# Patient Record
Sex: MALE
[De-identification: names, ages, dates, MRNs, and addresses within clinical notes are randomized per-mention and may not be internally consistent; named-entity substitution may affect disease eponyms.]

---

## 2020-10-27 ENCOUNTER — HOSPITAL ENCOUNTER (OUTPATIENT)
Dept: HOSPITAL 88 - ER | Age: 85
Setting detail: OBSERVATION
LOS: 1 days | Discharge: HOME | End: 2020-10-28
Attending: STUDENT IN AN ORGANIZED HEALTH CARE EDUCATION/TRAINING PROGRAM | Admitting: STUDENT IN AN ORGANIZED HEALTH CARE EDUCATION/TRAINING PROGRAM
Payer: MEDICARE

## 2020-10-27 VITALS — DIASTOLIC BLOOD PRESSURE: 81 MMHG | SYSTOLIC BLOOD PRESSURE: 122 MMHG

## 2020-10-27 VITALS — WEIGHT: 160 LBS | HEIGHT: 65 IN | BODY MASS INDEX: 26.66 KG/M2

## 2020-10-27 VITALS — SYSTOLIC BLOOD PRESSURE: 122 MMHG | DIASTOLIC BLOOD PRESSURE: 81 MMHG

## 2020-10-27 VITALS — SYSTOLIC BLOOD PRESSURE: 150 MMHG | DIASTOLIC BLOOD PRESSURE: 82 MMHG

## 2020-10-27 VITALS — DIASTOLIC BLOOD PRESSURE: 82 MMHG | SYSTOLIC BLOOD PRESSURE: 150 MMHG

## 2020-10-27 DIAGNOSIS — E78.5: ICD-10-CM

## 2020-10-27 DIAGNOSIS — E86.0: ICD-10-CM

## 2020-10-27 DIAGNOSIS — N17.9: ICD-10-CM

## 2020-10-27 DIAGNOSIS — I95.1: Primary | ICD-10-CM

## 2020-10-27 DIAGNOSIS — E03.9: ICD-10-CM

## 2020-10-27 DIAGNOSIS — Z08: ICD-10-CM

## 2020-10-27 DIAGNOSIS — Z85.46: ICD-10-CM

## 2020-10-27 LAB
ALBUMIN SERPL-MCNC: 3.5 G/DL (ref 3.5–5)
ALBUMIN/GLOB SERPL: 0.8 {RATIO} (ref 0.8–2)
ALP SERPL-CCNC: 53 IU/L (ref 40–150)
ALT SERPL-CCNC: 13 IU/L (ref 0–55)
ANION GAP SERPL CALC-SCNC: 14.4 MMOL/L (ref 8–16)
BACTERIA URNS QL MICRO: (no result) /HPF
BASOPHILS # BLD AUTO: 0.1 10*3/UL (ref 0–0.1)
BASOPHILS NFR BLD AUTO: 0.4 % (ref 0–1)
BILIRUB UR QL: NEGATIVE
BUN SERPL-MCNC: 21 MG/DL (ref 7–26)
BUN/CREAT SERPL: 17 (ref 6–25)
CALCIUM SERPL-MCNC: 9 MG/DL (ref 8.4–10.2)
CHLORIDE SERPL-SCNC: 107 MMOL/L (ref 98–107)
CK MB SERPL-MCNC: < 1 NG/ML (ref 0–5)
CK SERPL-CCNC: 44 IU/L (ref 30–200)
CK SERPL-CCNC: 48 IU/L (ref 30–200)
CLARITY UR: CLEAR
CO2 SERPL-SCNC: 25 MMOL/L (ref 22–29)
COLOR UR: YELLOW
DEPRECATED APTT PLAS QN: 25.5 SECONDS (ref 23.8–35.5)
DEPRECATED INR PLAS: 0.99
DEPRECATED NEUTROPHILS # BLD AUTO: 11.8 10*3/UL (ref 2.1–6.9)
DEPRECATED RBC URNS MANUAL-ACNC: (no result) /HPF (ref 0–5)
EGFRCR SERPLBLD CKD-EPI 2021: 54 ML/MIN (ref 60–?)
EOSINOPHIL # BLD AUTO: 0.1 10*3/UL (ref 0–0.4)
EOSINOPHIL NFR BLD AUTO: 0.5 % (ref 0–6)
EPI CELLS URNS QL MICRO: (no result) /LPF
ERYTHROCYTE [DISTWIDTH] IN CORD BLOOD: 14.3 % (ref 11.7–14.4)
GLOBULIN PLAS-MCNC: 4.2 G/DL (ref 2.3–3.5)
GLUCOSE SERPLBLD-MCNC: 114 MG/DL (ref 74–118)
HCT VFR BLD AUTO: 42.7 % (ref 38.2–49.6)
HGB BLD-MCNC: 13.8 G/DL (ref 14–18)
HYALINE CASTS #/AREA URNS LPF: (no result) /[LPF] (ref 0–1)
KETONES UR QL STRIP.AUTO: NEGATIVE
LEUKOCYTE ESTERASE UR QL STRIP.AUTO: NEGATIVE
LYMPHOCYTES # BLD: 2.6 10*3/UL (ref 1–3.2)
LYMPHOCYTES NFR BLD AUTO: 16.3 % (ref 18–39.1)
MAGNESIUM SERPL-MCNC: 1.9 MG/DL (ref 1.3–2.1)
MCH RBC QN AUTO: 28.8 PG (ref 28–32)
MCHC RBC AUTO-ENTMCNC: 32.3 G/DL (ref 31–35)
MCV RBC AUTO: 89 FL (ref 81–99)
MONOCYTES # BLD AUTO: 1.1 10*3/UL (ref 0.2–0.8)
MONOCYTES NFR BLD AUTO: 7.1 % (ref 4.4–11.3)
MUCOUS THREADS URNS QL MICRO: (no result)
NEUTS SEG NFR BLD AUTO: 75 % (ref 38.7–80)
NITRITE UR QL STRIP.AUTO: NEGATIVE
PLATELET # BLD AUTO: 358 X10E3/UL (ref 140–360)
POTASSIUM SERPL-SCNC: 4.4 MMOL/L (ref 3.5–5.1)
PROT UR QL STRIP.AUTO: NEGATIVE
PROTHROMBIN TIME: 13.6 SECONDS (ref 11.9–14.5)
RBC # BLD AUTO: 4.8 X10E6/UL (ref 4.3–5.7)
SODIUM SERPL-SCNC: 142 MMOL/L (ref 136–145)
SP GR UR STRIP: >=1.03 (ref 1.01–1.02)
UROBILINOGEN UR STRIP-MCNC: 1 MG/DL (ref 0.2–1)
WBC #/AREA URNS HPF: (no result) /HPF (ref 0–5)

## 2020-10-27 PROCEDURE — 36415 COLL VENOUS BLD VENIPUNCTURE: CPT

## 2020-10-27 PROCEDURE — 99284 EMERGENCY DEPT VISIT MOD MDM: CPT

## 2020-10-27 PROCEDURE — 84439 ASSAY OF FREE THYROXINE: CPT

## 2020-10-27 PROCEDURE — 82550 ASSAY OF CK (CPK): CPT

## 2020-10-27 PROCEDURE — 93005 ELECTROCARDIOGRAM TRACING: CPT

## 2020-10-27 PROCEDURE — 84443 ASSAY THYROID STIM HORMONE: CPT

## 2020-10-27 PROCEDURE — 85025 COMPLETE CBC W/AUTO DIFF WBC: CPT

## 2020-10-27 PROCEDURE — 85610 PROTHROMBIN TIME: CPT

## 2020-10-27 PROCEDURE — 80061 LIPID PANEL: CPT

## 2020-10-27 PROCEDURE — 81001 URINALYSIS AUTO W/SCOPE: CPT

## 2020-10-27 PROCEDURE — 87086 URINE CULTURE/COLONY COUNT: CPT

## 2020-10-27 PROCEDURE — 80053 COMPREHEN METABOLIC PANEL: CPT

## 2020-10-27 PROCEDURE — 93306 TTE W/DOPPLER COMPLETE: CPT

## 2020-10-27 PROCEDURE — 71045 X-RAY EXAM CHEST 1 VIEW: CPT

## 2020-10-27 PROCEDURE — 84484 ASSAY OF TROPONIN QUANT: CPT

## 2020-10-27 PROCEDURE — 82553 CREATINE MB FRACTION: CPT

## 2020-10-27 PROCEDURE — 83735 ASSAY OF MAGNESIUM: CPT

## 2020-10-27 PROCEDURE — 85730 THROMBOPLASTIN TIME PARTIAL: CPT

## 2020-10-27 PROCEDURE — 70450 CT HEAD/BRAIN W/O DYE: CPT

## 2020-10-27 PROCEDURE — 84100 ASSAY OF PHOSPHORUS: CPT

## 2020-10-27 PROCEDURE — 83880 ASSAY OF NATRIURETIC PEPTIDE: CPT

## 2020-10-27 NOTE — DIAGNOSTIC IMAGING REPORT
Examination: CT head without contrast

Clinical Indication: ^N ^SYNCOPE ^[id number] ^1110.

Technique: Transaxial noncontrast images from the skull base through the vertex

were obtained. Sagittal and coronal reformatted images were done.

Dose modulation, iterative reconstruction, and/or weight based adjustment of

the mA/kV was utilized to reduce the radiation dose to as low as reasonably

achievable. 

Comparison: None.



Findings:



Scalp: No abnormalities.

Bones: Intact. No fractures.  No blastic or lytic lesions. 



Brain sulci: Appropriate for patient's age.

Ventricles: Normal in size and configuration.   No hydrocephalus.  .



Extra-axial space:

No abnormalities.



Parenchyma: 

There are patchy areas of low-attenuation within subcortical and

periventricular white matter, nonspecific, but could represent microvascular

ischemic disease.

Chronic lacunar infarct in the left globus pallidus.

No masses, hemorrhage, or acute or chronic cortical based vascular insults.



Suprasellar region: No abnormalities.

Craniocervical junction: The foramen magnum is patent.  No Chiari one

malformation.



Impression:



1.  No acute intracranial finding.



2.  Mild chronic microvascular ischemic change. Chronic lacunar infarct in the

left globus pallidus.



Signed by: Dr. Melissa Rivera M.D. on 10/27/2020 12:21 PM

## 2020-10-27 NOTE — DIAGNOSTIC IMAGING REPORT
EXAMINATION:  CHEST SINGLE (PORTABLE)    



INDICATION: Syncope



COMPARISON: None

     

FINDINGS:



LINES/TUBES:EKG leads overlie the chest.



LUNGS:The lungs are moderately inflated. No focal consolidation or pulmonary

edema.



PLEURA:No pleural effusion or pneumothorax.



MEDIASTINUM:The cardiomediastinal silhouette appears normal in size and shape.

Atherosclerotic calcifications of the thoracic aorta.



BONES/SOFT TISSUES:No acute osseous injury.



ABDOMEN:No free air under the diaphragm.





IMPRESSION: 

No focal pneumonia or pulmonary edema.



Signed by: Shira Banegas MD on 10/27/2020 11:38 AM influenza

## 2020-10-27 NOTE — EMERGENCY DEPARTMENT NOTE
History of Present Illnes


History of Present Illness


Chief Complaint:  General Medicine Complaints


History of Present Illness


This is a 88 year old  male pt came in via POV for evaluation of syncope

(pt denies LOC but family says he was unresponsive/+LOC for ~1 minute), pt 

states that he was sitting at table drinking coffee when he fainted, denies 

actually falling to the floor but states that he experienced blurred vision 

prior to the fainting episode, denies any chest pain,dizziness or 

lightheadedness, pt has no complaints at this time.


Historian:  Patient, Family Member


Arrival Mode:  Car


 Required:  No


Onset (how long ago):  minute(s)


Radiation:  Reports non-radiation


Severity:  severe


Onset quality:  sudden


Timing of current episode:  sporadic


Progression:  resolved


Chronicity:  new


Context:  Denies recent illness


Relieving factors:  none


Exacerbating factors:  none


Associated symptoms:  Reports denies other symptoms





Past Medical/Family History


Physician Review


I have reviewed the patient's past medical and family history.  Any updates have

been documented here.





Past Medical History


Recent Fever:  No


Clinical Suspicion of Infectio:  No


New/Unexplained Change in Ment:  No


Past Medical History:  Hypertension, Hypothyroidism


Other Medical History:  


HIGH CHOLESTEROL





PROSTATE CA


Other Surgery:  


TURP





EXPLORATORY LAP





Social History


Smoking Cessation:  Never Smoker


Counseling Performed:  No


Alcohol Use:  None


Any Illegal Drug Use:  No


TB Exposure/Symptoms:  No


Physically hurt or threatened:  No





Family History


Family history of heart diseas:  No





Other


Last Tetanus:  Y


Any Pre-Existing Lines (PICC,:  No





Review of Systems


Review of Systems


Constitutional:  Reports no symptoms


EENTM:  Reports no symptoms


Cardiovascular:  Reports syncope


Respiratory:  Reports no symptoms


Gastrointestinal:  Reports no symptoms


Genitourinary:  Reports no symptoms


Musculoskeletal:  Reports no symptoms


Integumentary:  Reports no symptoms


Neurological:  Reports no symptoms


Psychological:  Reports no symptoms


Endocrine:  Reports no symptoms


Hematological/Lymphatic:  Reports no symptoms





Physical Exam


Related Data


Allergies:  


Coded Allergies:  


     No Known Allergies (Unverified , 3/6/16)


Triage Vital Signs





Vital Signs








  Date Time  Temp Pulse Resp B/P (MAP) Pulse Ox O2 Delivery O2 Flow Rate FiO2


 


10/27/20 10:15 97.9 92 20 140/103 99 Room Air  








Vital signs reviewed:  Yes





Physical Exam


CONSTITUTIONAL





Constitutional:  Present well-developed, Present well-nourished


HENT


HENT:  Present normocephalic, Present atraumatic, Present oropharynx 

clear/moist, Present nose normal


HENT L/R:  Present left ext ear normal, Present right ext ear normal


EYES





Eyes:  Reports PERRL, Reports conjunctivae normal


NECK


Neck:  Present ROM normal


PULMONARY


Pulmonary:  Present effort normal, Present breath sounds normal


CARDIOVASCULAR





Cardiovascular:  Present regular rhythm, Present heart sounds normal, Present 

capillary refill normal, Present normal rate


GASTROINTESTINAL





Abdominal:  Present soft, Present nontender, Present bowel sounds normal


GENITOURINARY





Genitourinary:  Present exam deferred


SKIN


Skin:  Present warm, Present dry


MUSCULOSKELETAL





Musculoskeletal:  Present ROM normal


NEUROLOGICAL





Neurological:  Present alert, Present oriented x 3, Present no gross motor or 

sensory deficits


PSYCHOLOGICAL


Psychological:  Present mood/affect normal, Present judgement normal





Results


Laboratory


Result Diagram:  


10/27/20 1016                                                                   

            10/27/20 1016





Laboratory





Laboratory Tests








Test


 10/27/20


11:47 10/27/20


10:16


 


White Blood Count


 


 15.74 x10e3/uL


(4.8-10.8)


 


Red Blood Count


 


 4.80 x10e6/uL


(4.3-5.7)


 


Hemoglobin


 


 13.8 g/dL


(14.0-18.0)


 


Hematocrit


 


 42.7 %


(38.2-49.6)


 


Mean Corpuscular Volume


 


 89.0 fL


(81-99)


 


Mean Corpuscular Hemoglobin


 


 28.8 pg


(28-32)


 


Mean Corpuscular Hemoglobin


Concent 


 32.3 g/dL


(31-35)


 


Red Cell Distribution Width


 


 14.3 %


(11.7-14.4)


 


Platelet Count


 


 358 x10e3/uL


(140-360)


 


Neutrophils (%) (Auto)


 


 75.0 %


(38.7-80.0)


 


Lymphocytes (%) (Auto)


 


 16.3 %


(18.0-39.1)


 


Monocytes (%) (Auto)


 


 7.1  %


(4.4-11.3)


 


Eosinophils (%) (Auto)


 


 0.5 %


(0.0-6.0)


 


Basophils (%) (Auto)


 


 0.4 %


(0.0-1.0)


 


Neutrophils # (Auto)  11.8 (2.1-6.9) 


 


Lymphocytes # (Auto)  2.6 (1.0-3.2) 


 


Monocytes # (Auto)  1.1 (0.2-0.8) 


 


Eosinophils # (Auto)  0.1 (0.0-0.4) 


 


Basophils # (Auto)  0.1 (0.0-0.1) 


 


Absolute Immature Granulocyte


(auto 


 0.11 x10e3/uL


(0-0.1)


 


Prothrombin Time


 


 13.6 seconds


(11.9-14.5)


 


Prothromb Time International


Ratio 


 0.99 





 


Activated Partial


Thromboplast Time 


 25.5 seconds


(23.8-35.5)


 


Sodium Level


 


 142 mmol/L


(136-145)


 


Potassium Level


 


 4.4 mmol/L


(3.5-5.1)


 


Chloride Level


 


 107 mmol/L


()


 


Carbon Dioxide Level


 


 25 mmol/L


(22-29)


 


Anion Gap


 


 14.4 mmol/L


(8-16)


 


Blood Urea Nitrogen


 


 21 mg/dL


(7-26)


 


Creatinine


 


 1.27 mg/dL


(0.72-1.25)


 


Estimat Glomerular Filtration


Rate 


 54 ML/MIN


(60-)


 


BUN/Creatinine Ratio  17 (6-25) 


 


Glucose Level


 


 114 mg/dL


()


 


Calcium Level


 


 9.0 mg/dL


(8.4-10.2)


 


Magnesium Level


 


 1.9 MG/DL


(1.3-2.1)


 


Total Bilirubin


 


 0.4 mg/dL


(0.2-1.2)


 


Aspartate Amino Transf


(AST/SGOT) 


 21 IU/L (5-34) 





 


Alanine Aminotransferase


(ALT/SGPT) 


 13 IU/L (0-55) 





 


Alkaline Phosphatase


 


 53 IU/L


()


 


Creatine Kinase


 


 48 IU/L


()


 


Creatine Kinase MB


 


 < 1.00 ng/mL


(0-5.0)


 


Troponin I


 


 0.001 ng/mL


(0-0.300)


 


B-Type Natriuretic Peptide


 


 32.2 pg/mL


(0-100)


 


Total Protein


 


 7.7 g/dL


(6.5-8.1)


 


Albumin


 


 3.5 g/dL


(3.5-5.0)


 


Globulin


 


 4.2 g/dL


(2.3-3.5)


 


Albumin/Globulin Ratio  0.8 (0.8-2.0) 








Lab results reviewed:  Yes





Imaging


Imaging results reviewed:  Yes


Impressions


EXAMINATION:  CHEST SINGLE (PORTABLE)    





INDICATION: Syncope





COMPARISON: None


     


FINDINGS:





LINES/TUBES:EKG leads overlie the chest.





LUNGS:The lungs are moderately inflated. No focal consolidation or pulmonary


edema.





PLEURA:No pleural effusion or pneumothorax.





MEDIASTINUM:The cardiomediastinal silhouette appears normal in size and shape.


Atherosclerotic calcifications of the thoracic aorta.





BONES/SOFT TISSUES:No acute osseous injury.





ABDOMEN:No free air under the diaphragm.








IMPRESSION: 


No focal pneumonia or pulmonary edema.





Signed by: Shira Banegas MD on 10/27/2020 11:38 AM





Procedures


12 Lead ECG Interpretation


ECG Interpretation :  


   ECG:  ECG 1


   :  Interpreted by ED physician


   Date:  Oct 27, 2020


   Time:  10:19


   Rhythm:  sinus rhythm


   Rate:  normal


   BPM:  97


   QRS axis:  normal


   ST segments normal:  Yes


   T waves normal:  Yes


   Clinical Impression:  normal ECG





Assessment & Plan


Medical Decision Making


MDM


syncope - check CBC, CHEM, ECG, CARDIACS, CT BRAIN, UA - EVAL FOR DYSRHYTHMIA, 

STEMI/NSTEMI, DEHYDRATION, RENAL INSUFF, ELECTROLYTE ABNL. ADMIT TELE, R/O 

DYSRHYTHMIA/NSTEMI





Reassessment


Reassessment


ADMIT TO DR DESI WELLS





Assessment & Plan


Final Impression:  


(1) Syncope


Depart Disposition:  ADMITTED


Last Vital Signs











  Date Time  Temp Pulse Resp B/P (MAP) Pulse Ox O2 Delivery O2 Flow Rate FiO2


 


10/27/20 10:15 97.9 92 20 140/103 99 Room Air  








Home Meds


Reported Medications


Pravastatin Sodium (PRAVASTATIN SODIUM) 20 Mg Tablet, 20 MG PO DAILY


   3/6/16


Levothyroxine Sodium (LEVOTHYROXINE SODIUM) 50 Mcg Tablet, 50 MCG PO DAILY, #30 

TAB


   3/6/16


Tamsulosin Hcl (TAMSULOSIN HCL) 0.4 Mg Cap.er.24h, 0.4 MG PO DAILY


   3/6/16


Medications in the ED





Sodium Chloride 1,000 ml @  0 mls/hr Q0M STAT IV ;  Start 10/27/20 at 10:30;  

Stop 10/27/20 at 10:34;  Status DC


Ondansetron HCl 4 mg Q4H  PRN IV NAUSEA AND VOMITING;  Start 10/27/20 at 12:00; 

Stop 11/26/20 at 11:59;  Status UNV


Acetaminophen 650 mg Q6H  PRN PO Mild Pain (1-3) or Fever>100.8;  Start 10/27/20

at 12:00;  Stop 11/26/20 at 11:59;  Status UNV











FATOUMATA ZUNIGA MD               Oct 27, 2020 12:03

## 2020-10-27 NOTE — NUR
Received patient from ER. Patient A/O X3, even respirations on RA. Lung sounds clear. Tele 
#6  with a first degree block. Left AC 20 gauge IV intact/patent. Oriented patient to 
room and call light. Call light in reach will continue to monitor patient.

## 2020-10-27 NOTE — NUR
Orthostatic vital signs:

Lying- BP: 144/74, HR: 113, O2: 99%

Sitting- BP: 122/79, HR: 122, O2: 99%

Standing- BP: 127/71, HR: 120, O2: 100%



Dr. Harrington made aware. New orders for NS @ 100 cc/hr x1 liter.

## 2020-10-27 NOTE — NUR
Patient visited in room (Rm 209) during nursing rounds. Patient alert and oriented x3. 
Ambulatory in room with standby assist prn. Plan to transfer pt room 201 for safety. Wife at 
bedside. On IVF (NS at 100ml/hr). Pt denies pain or nausea. Orthostatic V/S checked Qshift. 
Call bell within reach. Will monitor pt closely.

## 2020-10-27 NOTE — NUR
H&P



Chief Complaint - syncope



History of Present Illness

Mr Villeda is an 87 yo M with PMH significant for HLD, prostate cancer s/p 
TURP 2015, and hypothyroidism who presents after syncopal episode. Patient was a
t the breakfast table with family members when he began to feel light-headed 
with blurry vision then lost consciousness. He did not fall out of his chair 
or hit his head. He had laid his head down on the table and was unresponsive 
for about 1 minute per family members. Patient then regained consciousness and 
did not recall the event, last memory was feeling light-headed prior to losing 
consciousness. He was then brought to [facility] ED. 



In ED, vitals were stable, EKG normal. CT head without acute abnormalities. CXR 
unremarkable. Labs only significant for WBC 15k, anemia w/ hgb 13.8, and ROSETTE 
with Cr 1.27. Admitted for further work-up. 





Home Medications

Please see admission medication reconciliation



Review of Systems

General: No fever, chills, or fatigue

HEENT: Denies visual changes, hearing loss, congestion, rhinorrhea, or bleeding

Respiratory: No SOB, cough, or hemoptysis

Cardiovascular: No chest pain, palpitations, ARITA, orthopnea, PND, leg edema, or 
claudication

Gastrointestinal: No nausea, vomiting, diarrhea, constipation, or abdominal 
pain

G/U: Denies dysuria, hematuria, incontinence, or discharge

Musculoskeletal: No myalgias or arthralgias

Neurological: No syncope, seizures, headaches, changes in sensation, or 
weakness

Hematology: No bruising, bleeding, or lymphadenopathy

Endocrine: No heat or cold intolerance, hair loss, or weight changes

Skin: No rashes, sores, itching, bruising

Psychiatric: Denies depression or elevated mood, or anxiety



Past Medical History

Hyperlipidemia

Prostate cancer s/p TURP and exploratory laparotomy 2015



Past Surgical History

TURP and exploratory laparotomy for prostate Ca 2015

Left eye cataract surgery



Family History

Denies family history of heart disease

Mother with breast cancer



Social History

Does not drink, does not smoke, does not use drugs. 



Allergies

NKDA



Physical Exam

Vitals: Temp: 99.1P: 110BP: 122/81RR: 20SpO2: 98%

General Appearance: The patient is alert, oriented and in no acute distress. 
Appears euvolemic.

Skin: Warm and hydrated without any rash. 

HEENT: Head is normocephalic, atraumatic. Nontender sinuses. Pupils are equal 
and reactive. The nares are patent. Oropharynx is moist and clear without 
lesions.

Neck: Supple without lymphadenopathy. No JVD. Thyroid NV/NP

Heart / Cardiovascular: Regular rate and rhythm. Normal S1 and S2 without 
S3/S4. No murmurs, rubs or gallops. Peripheral pulses symmetric +2.

Respiratory / Chest: No crackles or wheezes are heard. Symmetric breath sounds. 
Preserved chest expansion.

Abdomen: Soft, nontender, nondistended with good bowel sounds heard. No 
clinical organomegaly.

Renal: There is no costovertebral angle tenderness.

Extremities: Without cyanosis, clubbing or edema. Preserved ROM. 

Neurological: Gross nonfocal. Patient oriented x 3. Cranial nerves II - XII 
Grossly intact. DTRs +2. MS: 5/5 globally.





Assessment/Plan 

#Syncope

#Orthostatic hypotension

- EKG with NSR

- keep on telemetry

- TTE ordered

- TSH

- orthostatic vital signs positive (lying down 144 SBP, dropped to 122 sitting 
and 127 standing)

- maintenance IVF started, likely dehydrated as patient admits to poor water 
intake and recent self-limited bout of diarrhea

- if above w/u negative, patient may benefit from event monitor outpatient; 
would need Cardiology follow-up



#Leukocytosis

- WBC 15k; patient afebrile

- UA not concerning for UTI

- CXR clear

- could be concentration in setting of dehydration



#ROSETTE

- unknown baseline Cr, presented with Cr 1.27

- likely pre-renal, will start IVF and recheck CMP in AM

- renally dose medications



#HLD

- continue home statin





I have spent 70 minutes face-to-face time with patient, reviewing clinical 
data, and formulating plan of treatment. 



Fran Harrington MD

Internal Medicine

## 2020-10-28 VITALS — SYSTOLIC BLOOD PRESSURE: 148 MMHG | DIASTOLIC BLOOD PRESSURE: 90 MMHG

## 2020-10-28 VITALS — SYSTOLIC BLOOD PRESSURE: 149 MMHG | DIASTOLIC BLOOD PRESSURE: 82 MMHG

## 2020-10-28 VITALS — SYSTOLIC BLOOD PRESSURE: 165 MMHG | DIASTOLIC BLOOD PRESSURE: 85 MMHG

## 2020-10-28 VITALS — DIASTOLIC BLOOD PRESSURE: 85 MMHG | SYSTOLIC BLOOD PRESSURE: 165 MMHG

## 2020-10-28 VITALS — DIASTOLIC BLOOD PRESSURE: 94 MMHG | SYSTOLIC BLOOD PRESSURE: 132 MMHG

## 2020-10-28 LAB
ALBUMIN SERPL-MCNC: 2.9 G/DL (ref 3.5–5)
ALBUMIN/GLOB SERPL: 0.8 {RATIO} (ref 0.8–2)
ALP SERPL-CCNC: 41 IU/L (ref 40–150)
ALT SERPL-CCNC: 11 IU/L (ref 0–55)
ANION GAP SERPL CALC-SCNC: 13.1 MMOL/L (ref 8–16)
BASOPHILS # BLD AUTO: 0 10*3/UL (ref 0–0.1)
BASOPHILS NFR BLD AUTO: 0.3 % (ref 0–1)
BUN SERPL-MCNC: 18 MG/DL (ref 7–26)
BUN/CREAT SERPL: 18 (ref 6–25)
CA-I BLDV-MCNC: 1.2 MMOL/L (ref 1.09–1.3)
CALCIUM SERPL-MCNC: 8.2 MG/DL (ref 8.4–10.2)
CHLORIDE SERPL-SCNC: 110 MMOL/L (ref 98–107)
CHOLEST SERPL-MCNC: 123 MD/DL (ref 0–199)
CHOLEST/HDLC SERPL: 2.9 {RATIO} (ref 3.9–4.7)
CK MB SERPL-MCNC: 1.6 NG/ML (ref 0–5)
CK SERPL-CCNC: 71 IU/L (ref 30–200)
CO2 SERPL-SCNC: 23 MMOL/L (ref 22–29)
DEPRECATED NEUTROPHILS # BLD AUTO: 9.1 10*3/UL (ref 2.1–6.9)
DEPRECATED PHOSPHATE SERPL-MCNC: 2.2 MG/DL (ref 2.3–4.7)
EGFRCR SERPLBLD CKD-EPI 2021: > 60 ML/MIN (ref 60–?)
EOSINOPHIL # BLD AUTO: 0.1 10*3/UL (ref 0–0.4)
EOSINOPHIL NFR BLD AUTO: 0.6 % (ref 0–6)
ERYTHROCYTE [DISTWIDTH] IN CORD BLOOD: 14.5 % (ref 11.7–14.4)
GLOBULIN PLAS-MCNC: 3.7 G/DL (ref 2.3–3.5)
GLUCOSE SERPLBLD-MCNC: 98 MG/DL (ref 74–118)
HCT VFR BLD AUTO: 39 % (ref 38.2–49.6)
HDLC SERPL-MSCNC: 42 MG/DL (ref 40–60)
HGB BLD-MCNC: 12.6 G/DL (ref 14–18)
LDLC SERPL CALC-MCNC: 67 MG/DL (ref 60–130)
LYMPHOCYTES # BLD: 1.9 10*3/UL (ref 1–3.2)
LYMPHOCYTES NFR BLD AUTO: 15.5 % (ref 18–39.1)
MAGNESIUM SERPL-MCNC: 1.8 MG/DL (ref 1.3–2.1)
MCH RBC QN AUTO: 28.8 PG (ref 28–32)
MCHC RBC AUTO-ENTMCNC: 32.3 G/DL (ref 31–35)
MCV RBC AUTO: 89 FL (ref 81–99)
MONOCYTES # BLD AUTO: 1.2 10*3/UL (ref 0.2–0.8)
MONOCYTES NFR BLD AUTO: 9.4 % (ref 4.4–11.3)
NEUTS SEG NFR BLD AUTO: 73.7 % (ref 38.7–80)
PLATELET # BLD AUTO: 196 X10E3/UL (ref 140–360)
POTASSIUM SERPL-SCNC: 5.1 MMOL/L (ref 3.5–5.1)
RBC # BLD AUTO: 4.38 X10E6/UL (ref 4.3–5.7)
SODIUM SERPL-SCNC: 141 MMOL/L (ref 136–145)
T4 FREE SERPL-MCNC: 1.09 NG/DL (ref 0.8–1.8)
TRIGL SERPL-MCNC: 69 MG/DL (ref 0–149)
TSH SERPL DL<=0.005 MIU/L-ACNC: 1.38 UIU/ML (ref 0.35–4.94)

## 2020-10-28 NOTE — NUR
Attempted to call Dr. Fran Harrington for possible medication for diarrhea per pt request but 
no answer from MD. Informed pt. Patient agreed will wait on MD in AM.

## 2020-10-28 NOTE — NUR
spoke with Dr. Fran Harrington regarding Echo order. no new orders received. Dr. Harrington says he 
is on his way to see patient.

## 2020-11-02 NOTE — NUR
Discharge Summary



Patient: Jasbir Villeda

Admission date: 10/27/2020 

Discharge date: 10/28/2020

Attending physician: Fran Harrington MD

Consultation: none



Admitting Diagnosis: syncope, orthostatic hypotension, dehydration, 
leukocytosis, ROSETTE, Hyperlipidemia, history of prostate cancer, hypothyroidism

Discharge Diagnosis: syncope, orthostatic hypotension, dehydration, 
leukocytosis, ROSETTE, Hyperlipidemia, history of prostate cancer, hypothyroidism



Procedures: None



Hospital Course: 

Mr Villeda is an 89 yo M with PMH significant for HLD, prostate cancer s/p 
TURP 2015, and hypothyroidism who presents after syncopal episode. Patient was a
t the breakfast table with family members when he began to feel light-headed 
with blurry vision then lost consciousness. He did not fall out of his chair 
or hit his head. He had laid his head down on the table and was unresponsive 
for about 1 minute per family members. Patient then regained consciousness and 
did not recall the event, last memory was feeling light-headed prior to losing 
consciousness. He was then brought to [facility] ED. 



In ED, vitals were stable, EKG normal. CT head without acute abnormalities. CXR 
unremarkable. Labs only significant for WBC 15k, anemia w/ hgb 13.8, and ROSETTE 
with Cr 1.27. Admitted for further work-up. 



Telemetry without arrhythmias noted in 24hours. Troponins negative. TTE with 
normal EF 60-65% and mild tricuspid regurgitation but no obvious cause of 
syncope. Positive orthostatic vitals, patient was given IV fluids with improveme
nt. He also had ROSETTE likely due to dehydration which improved after fluids. 
Patient had diarrhea for a few days prior to arrival to hospital and agrees he 
was not drinking enough fluids, so likely he was dehydrated. However this does 
fully rule out other causes of stroke. Patient advised to follow up with PCP 
and discuss seeing a cardiologist for outpatient workup including possible 
Holter monitor or event monitor. 





Physical Exam: 

Vitals: Temp: 99.1P: 110BP: 122/81RR: 20SpO2: 98%

General Appearance: The patient is alert, oriented and in no acute distress. 
Appears euvolemic.

Skin: Warm and hydrated without any rash. 

HEENT: Head is normocephalic, atraumatic. Nontender sinuses. Pupils are equal 
and reactive. The nares are patent. Oropharynx is moist and clear without 
lesions.

Neck: Supple without lymphadenopathy. No JVD. Thyroid NV/NP

Heart / Cardiovascular: Regular rate and rhythm. Normal S1 and S2 without 
S3/S4. No murmurs, rubs or gallops. Peripheral pulses symmetric +2.

Respiratory / Chest: No crackles or wheezes are heard. Symmetric breath sounds. 
Preserved chest expansion.

Abdomen: Soft, nontender, nondistended with good bowel sounds heard. No 
clinical organomegaly.

Renal: There is no costovertebral angle tenderness.

Extremities: Without cyanosis, clubbing or edema. Preserved ROM. 

Neurological: Gross nonfocal. Patient oriented x 3. Cranial nerves II - XII 
Grossly intact. DTRs +2. MS: 5/5 globally.



Discharge medications: 

See medication reconciliation



Discharge plan: 

Condition on discharge: good

Activity: as tolerated

Diet: discharge diet

Follow-up: follow up with your PCP within 1 week



Time spent on discharge: 45 minutes